# Patient Record
Sex: MALE | Race: WHITE | NOT HISPANIC OR LATINO | Employment: FULL TIME | URBAN - METROPOLITAN AREA
[De-identification: names, ages, dates, MRNs, and addresses within clinical notes are randomized per-mention and may not be internally consistent; named-entity substitution may affect disease eponyms.]

---

## 2024-08-27 ENCOUNTER — OFFICE VISIT (OUTPATIENT)
Dept: PRIMARY CARE | Facility: CLINIC | Age: 59
End: 2024-08-27
Payer: COMMERCIAL

## 2024-08-27 VITALS
HEIGHT: 74 IN | DIASTOLIC BLOOD PRESSURE: 82 MMHG | OXYGEN SATURATION: 98 % | WEIGHT: 261 LBS | HEART RATE: 78 BPM | SYSTOLIC BLOOD PRESSURE: 134 MMHG | BODY MASS INDEX: 33.5 KG/M2 | TEMPERATURE: 98.5 F

## 2024-08-27 DIAGNOSIS — Z85.72 HISTORY OF NON-HODGKIN'S LYMPHOMA: ICD-10-CM

## 2024-08-27 DIAGNOSIS — Z11.59 NEED FOR HEPATITIS C SCREENING TEST: ICD-10-CM

## 2024-08-27 DIAGNOSIS — Z13.0 SCREENING FOR DEFICIENCY ANEMIA: ICD-10-CM

## 2024-08-27 DIAGNOSIS — I10 PRIMARY HYPERTENSION: ICD-10-CM

## 2024-08-27 DIAGNOSIS — Z13.228 SCREENING FOR METABOLIC DISORDER: ICD-10-CM

## 2024-08-27 DIAGNOSIS — E78.2 MIXED HYPERLIPIDEMIA: ICD-10-CM

## 2024-08-27 DIAGNOSIS — Z00.00 ROUTINE ADULT HEALTH MAINTENANCE: Primary | ICD-10-CM

## 2024-08-27 DIAGNOSIS — Z12.5 PROSTATE CANCER SCREENING: ICD-10-CM

## 2024-08-27 DIAGNOSIS — Z12.11 COLON CANCER SCREENING: ICD-10-CM

## 2024-08-27 PROBLEM — I71.40 ABDOMINAL AORTIC ANEURYSM WITHOUT RUPTURE (CMS-HCC): Status: ACTIVE | Noted: 2019-03-08

## 2024-08-27 PROBLEM — N52.9 ERECTILE DYSFUNCTION: Status: ACTIVE | Noted: 2021-09-02

## 2024-08-27 PROBLEM — I25.10 ATHEROSCLEROTIC HEART DISEASE OF NATIVE CORONARY ARTERY WITHOUT ANGINA PECTORIS: Status: ACTIVE | Noted: 2019-05-17

## 2024-08-27 PROBLEM — I49.9 CARDIAC ARRHYTHMIA: Status: ACTIVE | Noted: 2017-07-24

## 2024-08-27 PROBLEM — M17.11 PRIMARY OSTEOARTHRITIS OF RIGHT KNEE: Status: ACTIVE | Noted: 2021-04-21

## 2024-08-27 PROBLEM — K21.9 GERD (GASTROESOPHAGEAL REFLUX DISEASE): Status: ACTIVE | Noted: 2021-11-28

## 2024-08-27 PROBLEM — I71.40 ABDOMINAL AORTIC ANEURYSM WITHOUT RUPTURE (CMS-HCC): Status: RESOLVED | Noted: 2019-03-08 | Resolved: 2024-08-27

## 2024-08-27 PROCEDURE — 99386 PREV VISIT NEW AGE 40-64: CPT | Performed by: FAMILY MEDICINE

## 2024-08-27 PROCEDURE — 3075F SYST BP GE 130 - 139MM HG: CPT | Performed by: FAMILY MEDICINE

## 2024-08-27 PROCEDURE — 3079F DIAST BP 80-89 MM HG: CPT | Performed by: FAMILY MEDICINE

## 2024-08-27 PROCEDURE — 3008F BODY MASS INDEX DOCD: CPT | Performed by: FAMILY MEDICINE

## 2024-08-27 PROCEDURE — 1036F TOBACCO NON-USER: CPT | Performed by: FAMILY MEDICINE

## 2024-08-27 PROCEDURE — 99203 OFFICE O/P NEW LOW 30 MIN: CPT | Performed by: FAMILY MEDICINE

## 2024-08-27 ASSESSMENT — PATIENT HEALTH QUESTIONNAIRE - PHQ9
1. LITTLE INTEREST OR PLEASURE IN DOING THINGS: NOT AT ALL
2. FEELING DOWN, DEPRESSED OR HOPELESS: NOT AT ALL
SUM OF ALL RESPONSES TO PHQ9 QUESTIONS 1 AND 2: 0

## 2024-08-27 ASSESSMENT — PAIN SCALES - GENERAL: PAINLEVEL: 0-NO PAIN

## 2024-08-27 NOTE — PROGRESS NOTES
Outpatient Visit Note    Chief Complaint   Patient presents with    New Patient Visit    Annual Exam       HPI:  Errol Baeza is a 58 y.o. male who presents to the office as a new patient to establish care and for annual well exam    Review notes a past medical history significant for but not exclusive to previous non-Hodgkin's lymphoma, CAD with associated hypertension and hyperlipidemia.  Recently moved to the area from [place].  States to have completed chemotherapy and is in need to establish with new oncology team for ongoing monitoring of IgG levels.  Does have history of suppressed immune system after treatment with prior pneumonia.  Did have recent upper respiratory congestion to which he went to local urgent care approximately 1 week ago.  Was placed on antibiotic course to which symptoms have improved.  Denies any active complaints of fever or congestion though does have mild lingering nonproductive cough.    Well Exam:  Overall, they describe their health as fair with no reports of recent illness or hospitalization. Denies issues of chest pain, shortness of breath, headaches, vision/hearing changes, abdominal pain, vomiting, diarrhea, melena, hematochezia, constipation or urinary symptoms.    States you have had previous fluctuating blood pressures when going through chemotherapy and with recurrent infections.  Denies any active use of antihypertensives.  States that blood pressures have been stable with most recent visit.  Does have blood pressure cuff at home though denies regularly checking    Preventative Health Maintenance:  In regards to preventative health maintenance, last Tdap received unknown. Flu shot not received for past season. n regards to CRC screening, states has had colonoscopy in the past though this was likely close to 10 years ago. COVID-19 vaccination series previously completed with patient having significant reaction to series.    Current Medications  No current  outpatient medications     Allergies  No Known Allergies     Immunizations    There is no immunization history on file for this patient.     Past Medical History:   Diagnosis Date    Hemolytic anemia (CMS-HCC)     Lymphoma (Multi)       Past Surgical History:   Procedure Laterality Date    BACK SURGERY      CHOLECYSTECTOMY      KNEE SURGERY      SHOULDER SURGERY      SPLENECTOMY, TOTAL       Family History   Problem Relation Name Age of Onset    Breast cancer Mother      Hypertension Other          family hx     Social History     Tobacco Use    Smoking status: Never    Smokeless tobacco: Never   Vaping Use    Vaping status: Never Used   Substance Use Topics    Alcohol use: Yes     Alcohol/week: 2.0 standard drinks of alcohol     Types: 2 Cans of beer per week     Comment: daily       ROS  All pertinent positive symptoms are included in the history of present illness.  All other systems have been reviewed and are negative and noncontributory to this patient's current ailments.    PHQ9/GAD7:        VITAL SIGNS  Vitals:    08/27/24 1505   BP: 134/82   Pulse: 78   Temp: 36.9 °C (98.5 °F)   SpO2: 98%       PHYSICAL EXAM  GENERAL APPEARANCE: alert and oriented, Pleasant and cooperative, No Acute Distress.   HEENT: EOMI, PERRLA, TMs intact and flat bilaterally, patent nares, normal oropharynx, MMM  NECK: no lymphadenopathy, no thyromegaly.   HEART: RRR, normal S1S2, no murmurs, click or rubs.   LUNGS: clear to auscultation bilaterally, no wheezes/rhonchi/rales.   ABDOMEN: soft, non-tender, no organomegaly, no masses palpated, no guarding or rigidity.   EXTREMITIES: no edema, normal ROM  SKIN: normal, no rash, unremarkable.   NEUROLOGIC EXAM: non-focal exam.   MUSCULOSKELETAL: no gross abnormalities.   PSYCH: affect is normal, eye contact is good.     Assessment/Plan   Problem List Items Addressed This Visit             ICD-10-CM    History of non-Hodgkin's lymphoma Z85.72     - Will plan to establish with local oncology  team with referral placed today         Relevant Orders    Referral to Hematology and Oncology    Mixed hyperlipidemia E78.2     - Will check cholesterol levels with next panel with routine blood work  -Continue to focus on healthy, low-fat diet         Relevant Orders    TSH with reflex to Free T4 if abnormal    Lipid Panel    Comprehensive Metabolic Panel    Primary hypertension I10     - Blood pressure stable in office today with no active use of antihypertensives         Relevant Orders    CBC     Other Visit Diagnoses         Codes    Routine adult health maintenance    -  Primary Z00.00    Relevant Orders    TSH with reflex to Free T4 if abnormal    Lipid Panel    Comprehensive Metabolic Panel    CBC    Hepatitis C antibody    Prostate Spec.Ag,Screen    Cologuard® colon cancer screening    Screening for deficiency anemia     Z13.0    Relevant Orders    CBC    Screening for metabolic disorder     Z13.228    Relevant Orders    TSH with reflex to Free T4 if abnormal    Lipid Panel    Comprehensive Metabolic Panel    Prostate cancer screening     Z12.5    Relevant Orders    Prostate Spec.Ag,Screen    Colon cancer screening     Z12.11    Relevant Orders    Cologuard® colon cancer screening    Need for hepatitis C screening test     Z11.59    Relevant Orders    Hepatitis C antibody            Additional Visit Plans:  - Complete history and physical examination was performed    GENERAL RECOMMENDATIONS:  - Complete review of history of physical exam completed today  - A healthy diet to maintain a normal BMI (under 25) to reduce heart disease, risk for diabetes encouraged.  - Exercising 150 minutes per week and eating healthy to reduce heart disease.  - Blood pressure screen completed.    BLOOD TESTING:  - Orders for fasting routine blood work given today, to be completed at your earliest convenience  - Will contact you with the blood work results once received and reviewed.    General Recommendations include:  -  Cholesterol and diabetes screen if risk factors (overweight, high blood pressure).  - Sexually transmitted infections if risk factors.  - Hepatitis C virus screen for all adults-ordered blood work today    VACCINATIONS RECOMMENDATIONS:  - Flu shot annually - advocated seasonally  - Tetanus booster every 10 years - advocated  - Pneumonia vaccination starting at 65 years old (or earlier if risk factors - smoker, diabetic, heart or lung conditions) -not due yet  - Shingles vaccine for those 50 years or older - check with your insurance for SHINGRIX coverage and get it at your local pharmacy -advocated  -COVID-19 vaccine series advocated    SCREENINGS RECOMMENDATIONS:  -Colon cancer screening (with colonoscopy or Cologuard) for men and women starting at age 45 until 74 years old - discussed and advocated    Counseling:       Medication education:         Education:  The patient is counseled regarding potential side-effects of all new medications        Understanding:  Patient expressed understanding        Adherence:  No barriers to adherence identified      ** Please excuse any errors in grammar or translation related to this dictation. Voice recognition software was utilized to prepare this document. **

## 2024-08-27 NOTE — PATIENT INSTRUCTIONS
Problem List Items Addressed This Visit             ICD-10-CM    History of non-Hodgkin's lymphoma Z85.72     - Will plan to establish with local oncology team with referral placed today         Relevant Orders    Referral to Hematology and Oncology    Mixed hyperlipidemia E78.2     - Will check cholesterol levels with next panel with routine blood work  -Continue to focus on healthy, low-fat diet         Relevant Orders    TSH with reflex to Free T4 if abnormal    Lipid Panel    Comprehensive Metabolic Panel    Primary hypertension I10     - Blood pressure stable in office today with no active use of antihypertensives         Relevant Orders    CBC     Other Visit Diagnoses         Codes    Routine adult health maintenance    -  Primary Z00.00    Relevant Orders    TSH with reflex to Free T4 if abnormal    Lipid Panel    Comprehensive Metabolic Panel    CBC    Hepatitis C antibody    Prostate Spec.Ag,Screen    Cologuard® colon cancer screening    Screening for deficiency anemia     Z13.0    Relevant Orders    CBC    Screening for metabolic disorder     Z13.228    Relevant Orders    TSH with reflex to Free T4 if abnormal    Lipid Panel    Comprehensive Metabolic Panel    Prostate cancer screening     Z12.5    Relevant Orders    Prostate Spec.Ag,Screen    Colon cancer screening     Z12.11    Relevant Orders    Cologuard® colon cancer screening    Need for hepatitis C screening test     Z11.59    Relevant Orders    Hepatitis C antibody            Additional Visit Plans:  - Complete history and physical examination was performed    GENERAL RECOMMENDATIONS:  - Complete review of history of physical exam completed today  - A healthy diet to maintain a normal BMI (under 25) to reduce heart disease, risk for diabetes encouraged.  - Exercising 150 minutes per week and eating healthy to reduce heart disease.  - Blood pressure screen completed.    BLOOD TESTING:  - Orders for fasting routine blood work given today, to be  completed at your earliest convenience  - Will contact you with the blood work results once received and reviewed.    General Recommendations include:  - Cholesterol and diabetes screen if risk factors (overweight, high blood pressure).  - Sexually transmitted infections if risk factors.  - Hepatitis C virus screen for all adults-ordered blood work today    VACCINATIONS RECOMMENDATIONS:  - Flu shot annually - advocated seasonally  - Tetanus booster every 10 years - advocated  - Pneumonia vaccination starting at 65 years old (or earlier if risk factors - smoker, diabetic, heart or lung conditions) -not due yet  - Shingles vaccine for those 50 years or older - check with your insurance for SHINGRIX coverage and get it at your local pharmacy -advocated  -COVID-19 vaccine series advocated    SCREENINGS RECOMMENDATIONS:  -Colon cancer screening (with colonoscopy or Cologuard) for men and women starting at age 45 until 74 years old - discussed and advocated    Counseling:       Medication education:         Education:  The patient is counseled regarding potential side-effects of all new medications        Understanding:  Patient expressed understanding        Adherence:  No barriers to adherence identified      ** Please excuse any errors in grammar or translation related to this dictation. Voice recognition software was utilized to prepare this document. **

## 2024-08-27 NOTE — ASSESSMENT & PLAN NOTE
- Will check cholesterol levels with next panel with routine blood work  -Continue to focus on healthy, low-fat diet

## 2024-09-16 ENCOUNTER — APPOINTMENT (OUTPATIENT)
Dept: PRIMARY CARE | Facility: CLINIC | Age: 59
End: 2024-09-16
Payer: COMMERCIAL

## 2024-11-19 ENCOUNTER — APPOINTMENT (OUTPATIENT)
Dept: HEMATOLOGY/ONCOLOGY | Facility: CLINIC | Age: 59
End: 2024-11-19
Payer: COMMERCIAL

## 2024-12-11 ENCOUNTER — TELEPHONE (OUTPATIENT)
Dept: PRIMARY CARE | Facility: CLINIC | Age: 59
End: 2024-12-11
Payer: COMMERCIAL

## 2024-12-11 NOTE — TELEPHONE ENCOUNTER
Unfortunately, I do not know if patient has intentions to continue with follow-up plans as he subsequently canceled his follow-up appointment with our office and has not followed through with any blood work which had been ordered.  Will see if patient ultimately plans for follow-up to which we can further discuss making up with specialist if warranted going forward

## 2024-12-11 NOTE — TELEPHONE ENCOUNTER
Message from scheduling department:   Hello our sched dept reached out to pt 5x to resched HEM/ONC appt but no response from pt.   How would you like to proceed?   Thank you    Please advise.

## 2025-01-19 ENCOUNTER — OFFICE VISIT (OUTPATIENT)
Dept: URGENT CARE | Age: 60
End: 2025-01-19
Payer: COMMERCIAL

## 2025-01-19 VITALS
DIASTOLIC BLOOD PRESSURE: 112 MMHG | RESPIRATION RATE: 16 BRPM | BODY MASS INDEX: 34.67 KG/M2 | SYSTOLIC BLOOD PRESSURE: 155 MMHG | OXYGEN SATURATION: 95 % | HEART RATE: 74 BPM | WEIGHT: 270 LBS | TEMPERATURE: 97.9 F

## 2025-01-19 DIAGNOSIS — S61.209A AVULSION OF FINGERTIP, INITIAL ENCOUNTER: Primary | ICD-10-CM

## 2025-01-19 RX ORDER — CEPHALEXIN 500 MG/1
500 CAPSULE ORAL 4 TIMES DAILY
Qty: 21 CAPSULE | Refills: 0 | Status: SHIPPED | OUTPATIENT
Start: 2025-01-19 | End: 2025-01-24

## 2025-01-19 RX ORDER — CEPHALEXIN 500 MG/1
500 CAPSULE ORAL 4 TIMES DAILY
Qty: 21 CAPSULE | Refills: 0 | Status: SHIPPED | OUTPATIENT
Start: 2025-01-19 | End: 2025-01-19

## 2025-01-19 NOTE — PATIENT INSTRUCTIONS
Thank you for visiting  urgent care.      Recheck elevated BP with primary care provider.      Monitor for signs and symptoms of infection such as redness, swelling, pain, white or yellow drainage from the wound or fever or chills.  If any of these occur follow-up with the emergency department for further evaluation of your symptoms.  Beginning tomorrow cleanse around the wound with gentle soap and water and pat dry.  Do not submerge in water such as washing dishes or swimming until wound has completely healed.

## 2025-01-19 NOTE — PROGRESS NOTES
Subjective   Patient ID: Errol Baeza is a 59 y.o. male. They present today with a chief complaint of left cut finger (1 hour ago with table saw/).    History of Present Illness  See mdm           Past Medical History  Allergies as of 01/19/2025    (No Known Allergies)       (Not in a hospital admission)       Past Medical History:   Diagnosis Date    Hemolytic anemia (CMS-HCC)     Lymphoma        Past Surgical History:   Procedure Laterality Date    BACK SURGERY      CHOLECYSTECTOMY      KNEE SURGERY      SHOULDER SURGERY      SPLENECTOMY, TOTAL          reports that he has never smoked. He has never used smokeless tobacco. He reports current alcohol use of about 2.0 standard drinks of alcohol per week.    Review of Systems  Review of Systems   All other systems reviewed and are negative.                                 Objective    Vitals:    01/19/25 1331 01/19/25 1405   BP: (!) 165/115 (!) 155/112   BP Location: Right arm    Patient Position: Sitting    BP Cuff Size: Large adult    Pulse: 74    Resp: 16    Temp: 36.6 °C (97.9 °F)    TempSrc: Oral    SpO2: 95%    Weight: 122 kg (270 lb)      No LMP for male patient.    Physical Exam  Vitals and nursing note reviewed.   Constitutional:       General: He is not in acute distress.     Appearance: He is normal weight. He is not ill-appearing, toxic-appearing or diaphoretic.   HENT:      Head: Normocephalic and atraumatic.      Nose: Nose normal.      Mouth/Throat:      Mouth: Mucous membranes are moist.      Pharynx: No oropharyngeal exudate or posterior oropharyngeal erythema.   Eyes:      General: No scleral icterus.        Right eye: No discharge.         Left eye: No discharge.      Extraocular Movements: Extraocular movements intact.      Conjunctiva/sclera: Conjunctivae normal.      Pupils: Pupils are equal, round, and reactive to light.   Cardiovascular:      Rate and Rhythm: Normal rate and regular rhythm.      Pulses: Normal pulses.      Heart sounds: No  murmur heard.     No friction rub. No gallop.   Pulmonary:      Effort: Pulmonary effort is normal. No respiratory distress.   Abdominal:      General: Abdomen is flat.   Musculoskeletal:         General: Tenderness and signs of injury present. No swelling or deformity.      Cervical back: Normal range of motion and neck supple. No rigidity or tenderness.      Comments: Left third digit: Tip of finger pad with 1.5 cm avulsion type injury hemostatic at time of exam.  No bony or other underlying structure exposure.  No bony tenderness.  Range of motion of the left middle finger completely intact and nonpainful.  Neurovascular intact finger.   Skin:     General: Skin is warm and dry.      Capillary Refill: Capillary refill takes less than 2 seconds.   Neurological:      General: No focal deficit present.      Mental Status: He is alert.   Psychiatric:         Mood and Affect: Mood normal.         Behavior: Behavior normal.         Laceration Repair    Date/Time: 1/19/2025 2:00 PM    Performed by: Yuly Sky PA-C  Authorized by: Yuly Sky PA-C    Consent:     Consent given by:  Patient    Risks discussed:  Infection and pain  Universal protocol:     Patient identity confirmed:  Verbally with patient  Anesthesia:     Anesthesia method:  None  Laceration details:     Location:  Finger    Finger location:  L long finger    Length (cm):  1.5    Depth (mm):  3  Exploration:     Wound exploration: wound explored through full range of motion and entire depth of wound visualized      Wound extent: no foreign bodies/material noted, no muscle damage noted, no nerve damage noted, no tendon damage noted, no underlying fracture noted and no vascular damage noted      Contaminated: no    Treatment:     Area cleansed with:  Chlorhexidine    Amount of cleaning:  Standard    Irrigation solution:  Sterile water    Visualized foreign bodies/material removed: no      Debridement:  None  Skin repair:     Repair method:   Tissue adhesive  Post-procedure details:     Dressing:  Bulky dressing    Procedure completion:  Tolerated well, no immediate complications      Point of Care Test & Imaging Results from this visit  No results found for this visit on 01/19/25.   No results found.    Diagnostic study results (if any) were reviewed by Yuly Sky PA-C.    Assessment/Plan   Allergies, medications, history, and pertinent labs/EKGs/Imaging reviewed by Yuly Sky PA-C.     Medical Decision Making  59 year old male, asplenic presents with complaint of left middle finger table saw injury.  Patient reports he cut finger pad of left middle finger with tablesaw several minutes prior to arrival in the clinic.  He is unsure of last tetanus vaccination.  He does have history of splenectomy due to hemolytic anemia many years ago.  Also has history of non-Hodgkin's lymphoma with chemotherapy last 2 years ago.  Exam remarkable for fingertip avulsion injury as above.  Neurovascular intact.  Injury not amenable to suture repair.  Layers of skin adhesive applied for protection during healing by secondary intention.  Tetanus updated.  Given patient's history of asplenia he is given prescription for Keflex.  Discussed signs and symptoms of infection, indications for return or follow-up with emergency department.  Discussed wound care.  No features suggestive of neurovascular compromise, bony injury, retained foreign body, underlying structure injury or other emergency.  Discharged good condition agreeable to plan as discussed.  Follow-up with primary care provider for elevated blood pressure.  There is no headache, dizziness, chest pain or other features suggestive of hypertensive emergency.      Orders and Diagnoses  Diagnoses and all orders for this visit:  Avulsion of fingertip, initial encounter  -     cephalexin (Keflex) 500 mg capsule; Take 1 capsule (500 mg) by mouth 4 times a day for 5 days.  Other orders  -     Tdap vaccine, age 7  years and older  (BOOSTRIX)      Medical Admin Record      Patient disposition: Home    Electronically signed by Yuly Sky PA-C  2:52 PM

## 2025-03-13 PROBLEM — C85.90 NON-HODGKIN LYMPHOMA: Status: ACTIVE | Noted: 2025-03-13

## 2025-03-28 ENCOUNTER — APPOINTMENT (OUTPATIENT)
Dept: PRIMARY CARE | Facility: CLINIC | Age: 60
End: 2025-03-28
Payer: COMMERCIAL

## 2025-03-28 VITALS
DIASTOLIC BLOOD PRESSURE: 86 MMHG | WEIGHT: 268.8 LBS | SYSTOLIC BLOOD PRESSURE: 142 MMHG | OXYGEN SATURATION: 97 % | TEMPERATURE: 97.9 F | HEART RATE: 61 BPM | BODY MASS INDEX: 34.5 KG/M2 | HEIGHT: 74 IN

## 2025-03-28 DIAGNOSIS — K21.9 GASTROESOPHAGEAL REFLUX DISEASE, UNSPECIFIED WHETHER ESOPHAGITIS PRESENT: ICD-10-CM

## 2025-03-28 DIAGNOSIS — Z11.59 NEED FOR HEPATITIS C SCREENING TEST: ICD-10-CM

## 2025-03-28 DIAGNOSIS — I25.10 ATHEROSCLEROSIS OF NATIVE CORONARY ARTERY OF NATIVE HEART WITHOUT ANGINA PECTORIS: Primary | ICD-10-CM

## 2025-03-28 DIAGNOSIS — E78.2 MIXED HYPERLIPIDEMIA: ICD-10-CM

## 2025-03-28 DIAGNOSIS — Z85.72 HISTORY OF NON-HODGKIN'S LYMPHOMA: ICD-10-CM

## 2025-03-28 DIAGNOSIS — Z12.5 PROSTATE CANCER SCREENING: ICD-10-CM

## 2025-03-28 DIAGNOSIS — I10 PRIMARY HYPERTENSION: ICD-10-CM

## 2025-03-28 DIAGNOSIS — R73.9 HYPERGLYCEMIA: ICD-10-CM

## 2025-03-28 DIAGNOSIS — R53.83 FATIGUE, UNSPECIFIED TYPE: ICD-10-CM

## 2025-03-28 DIAGNOSIS — E55.9 VITAMIN D DEFICIENCY: ICD-10-CM

## 2025-03-28 PROCEDURE — 3079F DIAST BP 80-89 MM HG: CPT | Performed by: NURSE PRACTITIONER

## 2025-03-28 PROCEDURE — 3077F SYST BP >= 140 MM HG: CPT | Performed by: NURSE PRACTITIONER

## 2025-03-28 PROCEDURE — 1036F TOBACCO NON-USER: CPT | Performed by: NURSE PRACTITIONER

## 2025-03-28 PROCEDURE — 99214 OFFICE O/P EST MOD 30 MIN: CPT | Performed by: NURSE PRACTITIONER

## 2025-03-28 PROCEDURE — 3008F BODY MASS INDEX DOCD: CPT | Performed by: NURSE PRACTITIONER

## 2025-03-28 RX ORDER — LOSARTAN POTASSIUM 25 MG/1
25 TABLET ORAL DAILY
Qty: 90 TABLET | Refills: 3 | Status: SHIPPED | OUTPATIENT
Start: 2025-03-28 | End: 2026-03-28

## 2025-03-28 ASSESSMENT — ENCOUNTER SYMPTOMS
DEPRESSION: 0
POLYPHAGIA: 0
PALPITATIONS: 0
GASTROINTESTINAL NEGATIVE: 1
BRUISES/BLEEDS EASILY: 0
SHORTNESS OF BREATH: 0
FEVER: 0
NEUROLOGICAL NEGATIVE: 1
ADENOPATHY: 0
LOSS OF SENSATION IN FEET: 0
OCCASIONAL FEELINGS OF UNSTEADINESS: 0
COUGH: 0
MUSCULOSKELETAL NEGATIVE: 1
CHILLS: 0
EYES NEGATIVE: 1
PSYCHIATRIC NEGATIVE: 1
ALLERGIC/IMMUNOLOGIC NEGATIVE: 1
POLYDIPSIA: 0

## 2025-03-28 ASSESSMENT — LIFESTYLE VARIABLES
HAVE YOU OR SOMEONE ELSE BEEN INJURED AS A RESULT OF YOUR DRINKING: NO
HOW OFTEN DURING THE LAST YEAR HAVE YOU NEEDED AN ALCOHOLIC DRINK FIRST THING IN THE MORNING TO GET YOURSELF GOING AFTER A NIGHT OF HEAVY DRINKING: NEVER
HOW OFTEN DURING THE LAST YEAR HAVE YOU FOUND THAT YOU WERE NOT ABLE TO STOP DRINKING ONCE YOU HAD STARTED: NEVER
HOW OFTEN DURING THE LAST YEAR HAVE YOU FAILED TO DO WHAT WAS NORMALLY EXPECTED FROM YOU BECAUSE OF DRINKING: NEVER
HOW MANY STANDARD DRINKS CONTAINING ALCOHOL DO YOU HAVE ON A TYPICAL DAY: 1 OR 2
AUDIT-C TOTAL SCORE: 4
HOW OFTEN DO YOU HAVE A DRINK CONTAINING ALCOHOL: 4 OR MORE TIMES A WEEK
SKIP TO QUESTIONS 9-10: 1
HOW OFTEN DO YOU HAVE SIX OR MORE DRINKS ON ONE OCCASION: NEVER
HAS A RELATIVE, FRIEND, DOCTOR, OR ANOTHER HEALTH PROFESSIONAL EXPRESSED CONCERN ABOUT YOUR DRINKING OR SUGGESTED YOU CUT DOWN: NO
HOW OFTEN DURING THE LAST YEAR HAVE YOU BEEN UNABLE TO REMEMBER WHAT HAPPENED THE NIGHT BEFORE BECAUSE YOU HAD BEEN DRINKING: NEVER
AUDIT TOTAL SCORE: 4
HOW OFTEN DURING THE LAST YEAR HAVE YOU HAD A FEELING OF GUILT OR REMORSE AFTER DRINKING: NEVER

## 2025-03-28 ASSESSMENT — PATIENT HEALTH QUESTIONNAIRE - PHQ9
1. LITTLE INTEREST OR PLEASURE IN DOING THINGS: NOT AT ALL
SUM OF ALL RESPONSES TO PHQ9 QUESTIONS 1 AND 2: 0
2. FEELING DOWN, DEPRESSED OR HOPELESS: NOT AT ALL

## 2025-03-28 ASSESSMENT — PAIN SCALES - GENERAL: PAINLEVEL_OUTOF10: 0-NO PAIN

## 2025-03-28 NOTE — PATIENT INSTRUCTIONS
[facility]  [address] [place] [address]  [phone]  Focus on healthy eating including lean proteins and vegetables.  Avoid high carbohydrate high sugar foods and drinks.  Try to increase physical activity as tolerated.  Goal is for 160 minutes/week of physical activity.  Focus on healthy eating including lean proteins and vegetables.  Avoid high carbohydrate high sugar foods and drinks.  Try to increase physical activity as tolerated.  Goal is for 160 minutes/week of physical activity.    Labwork obtained today.  Will address results when available

## 2025-03-28 NOTE — PROGRESS NOTES
"Subjective   Patient ID: Errol Baeza is a 59 y.o. male who presents for Establish Care (Patient mentioned a physical for today.).    Patient presents today to [facility]. Review notes a past medical history significant for but not exclusive to previous non-Hodgkin's lymphoma, hemolytic anemia, CAD with associated hypertension and hyperlipidemia.  Has had splenectomy. Recently moved to the area from [place].  States he has completed chemotherapy and is in need to establish with new oncology team for ongoing monitoring of IgG levels.  Does have history of suppressed immune system after treatment with prior pneumonia.  Did have recent upper respiratory congestion to which he went to local urgent care approximately 1 week ago.  Was placed on antibiotic course to which symptoms have improved.  Denies any active complaints of fever or congestion though does have mild lingering nonproductive cough. Patient states left eye pressure when /100 at home and lowest is 140/88. Has not been taking his losartan. He stopped Losartan and Rosuvastatin on his own as he \"does not like to take medications\". Discussed HTN and risks and benefits of treatment to lower Bp.  At this time he is agreeable to restart losartan.  He is due for lab work which we will order today.  We will address results when available.  He was previously referred to oncology Dr. Rodgers, but never made appointment.  Will rerefer to hematology oncology for follow-up.       Review of Systems   Constitutional:  Negative for chills and fever.   HENT: Negative.     Eyes: Negative.    Respiratory:  Negative for cough and shortness of breath.    Cardiovascular:  Negative for chest pain and palpitations.   Gastrointestinal: Negative.    Endocrine: Negative for heat intolerance, polydipsia, polyphagia and polyuria.   Genitourinary: Negative.    Musculoskeletal: Negative.    Skin: Negative.    Allergic/Immunologic: Negative.    Neurological: Negative.  " "  Hematological:  Negative for adenopathy. Does not bruise/bleed easily.   Psychiatric/Behavioral: Negative.         Objective   /86   Pulse 61   Temp 36.6 °C (97.9 °F) (Temporal)   Ht 1.88 m (6' 2\")   Wt 122 kg (268 lb 12.8 oz)   SpO2 97%   BMI 34.51 kg/m²     Physical Exam  Constitutional:       Appearance: Normal appearance.   HENT:      Head: Normocephalic.      Right Ear: Tympanic membrane normal.      Left Ear: Tympanic membrane normal.      Nose: Nose normal.      Mouth/Throat:      Mouth: Mucous membranes are dry.   Eyes:      General:         Right eye: No discharge.         Left eye: No discharge.   Neck:      Vascular: No carotid bruit.   Cardiovascular:      Rate and Rhythm: Normal rate and regular rhythm.      Pulses: Normal pulses.      Heart sounds: Normal heart sounds.   Pulmonary:      Effort: Pulmonary effort is normal.      Breath sounds: Normal breath sounds.   Abdominal:      General: Bowel sounds are normal.      Palpations: Abdomen is soft.   Musculoskeletal:         General: Normal range of motion.      Cervical back: No tenderness.   Lymphadenopathy:      Cervical: No cervical adenopathy.   Skin:     General: Skin is warm and dry.      Capillary Refill: Capillary refill takes less than 2 seconds.   Neurological:      General: No focal deficit present.      Mental Status: He is alert and oriented to person, place, and time.   Psychiatric:         Mood and Affect: Mood normal.       Assessment/Plan   Problem List Items Addressed This Visit             ICD-10-CM    Atherosclerotic heart disease of native coronary artery without angina pectoris - Primary I25.10    Relevant Orders    Follow Up In Primary Care - Established    GERD (gastroesophageal reflux disease) K21.9    Relevant Orders    Follow Up In Primary Care - Established    History of non-Hodgkin's lymphoma Z85.72    Relevant Orders    Referral To Hematology and Oncology    Mixed hyperlipidemia E78.2    Primary hypertension " I10    Relevant Medications    losartan (Cozaar) 25 mg tablet    Other Relevant Orders    Comprehensive Metabolic Panel     Other Visit Diagnoses         Codes    Hyperglycemia     R73.9    Relevant Orders    Hemoglobin A1C    Fatigue, unspecified type     R53.83    Relevant Orders    CBC and Auto Differential    TSH with reflex to Free T4 if abnormal    Follow Up In Primary Care - Established    Vitamin D deficiency     E55.9    Relevant Orders    Vitamin D 25-Hydroxy,Total (for eval of Vitamin D levels)    Prostate cancer screening     Z12.5    Relevant Orders    Prostate Specific Antigen    Need for hepatitis C screening test     Z11.59    Relevant Orders    Hepatitis C Antibody        [facility]  [address] [place], [place] [id number] [phone]  Focus on healthy eating including lean proteins and vegetables.  Avoid high carbohydrate high sugar foods and drinks.  Try to increase physical activity as tolerated.  Goal is for 160 minutes/week of physical activity.  Focus on healthy eating including lean proteins and vegetables.  Avoid high carbohydrate high sugar foods and drinks.  Try to increase physical activity as tolerated.  Goal is for 160 minutes/week of physical activity.

## 2025-03-29 ENCOUNTER — PATIENT MESSAGE (OUTPATIENT)
Dept: PRIMARY CARE | Facility: CLINIC | Age: 60
End: 2025-03-29
Payer: COMMERCIAL

## 2025-03-29 DIAGNOSIS — Z85.72 HISTORY OF NON-HODGKIN'S LYMPHOMA: Primary | ICD-10-CM

## 2025-03-29 DIAGNOSIS — R53.83 FATIGUE, UNSPECIFIED TYPE: ICD-10-CM

## 2025-03-29 DIAGNOSIS — R55 NEAR SYNCOPE: ICD-10-CM

## 2025-04-01 DIAGNOSIS — E78.2 MIXED HYPERLIPIDEMIA: Primary | ICD-10-CM

## 2025-04-03 LAB
CHOLEST SERPL-MCNC: 197 MG/DL
CHOLEST/HDLC SERPL: 3.8 (CALC)
HDLC SERPL-MCNC: 52 MG/DL
LDLC SERPL CALC-MCNC: 126 MG/DL (CALC)
NONHDLC SERPL-MCNC: 145 MG/DL (CALC)
TRIGL SERPL-MCNC: 93 MG/DL

## 2025-04-05 LAB
25(OH)D3+25(OH)D2 SERPL-MCNC: 24 NG/ML (ref 30–100)
ALBUMIN SERPL-MCNC: 4.4 G/DL (ref 3.6–5.1)
ALP SERPL-CCNC: 86 U/L (ref 35–144)
ALT SERPL-CCNC: 15 U/L (ref 9–46)
ANION GAP SERPL CALCULATED.4IONS-SCNC: 9 MMOL/L (CALC) (ref 7–17)
AST SERPL-CCNC: 19 U/L (ref 10–35)
BASOPHILS # BLD AUTO: 109 CELLS/UL (ref 0–200)
BASOPHILS NFR BLD AUTO: 1.2 %
BILIRUB SERPL-MCNC: 0.8 MG/DL (ref 0.2–1.2)
BUN SERPL-MCNC: 12 MG/DL (ref 7–25)
CALCIUM SERPL-MCNC: 9.3 MG/DL (ref 8.6–10.3)
CHLORIDE SERPL-SCNC: 101 MMOL/L (ref 98–110)
CO2 SERPL-SCNC: 28 MMOL/L (ref 20–32)
CREAT SERPL-MCNC: 0.84 MG/DL (ref 0.7–1.3)
EGFRCR SERPLBLD CKD-EPI 2021: 100 ML/MIN/1.73M2
EOSINOPHIL # BLD AUTO: 710 CELLS/UL (ref 15–500)
EOSINOPHIL NFR BLD AUTO: 7.8 %
ERYTHROCYTE [DISTWIDTH] IN BLOOD BY AUTOMATED COUNT: 13.4 % (ref 11–15)
EST. AVERAGE GLUCOSE BLD GHB EST-MCNC: 114 MG/DL
EST. AVERAGE GLUCOSE BLD GHB EST-SCNC: 6.3 MMOL/L
GLUCOSE SERPL-MCNC: 91 MG/DL (ref 65–99)
HBA1C MFR BLD: 5.6 % OF TOTAL HGB
HCT VFR BLD AUTO: 49.8 % (ref 38.5–50)
HCV AB SERPL QL IA: NORMAL
HGB BLD-MCNC: 16.8 G/DL (ref 13.2–17.1)
LYMPHOCYTES # BLD AUTO: 1993 CELLS/UL (ref 850–3900)
LYMPHOCYTES NFR BLD AUTO: 21.9 %
MCH RBC QN AUTO: 32.4 PG (ref 27–33)
MCHC RBC AUTO-ENTMCNC: 33.7 G/DL (ref 32–36)
MCV RBC AUTO: 96 FL (ref 80–100)
MONOCYTES # BLD AUTO: 1019 CELLS/UL (ref 200–950)
MONOCYTES NFR BLD AUTO: 11.2 %
NEUTROPHILS # BLD AUTO: 5269 CELLS/UL (ref 1500–7800)
NEUTROPHILS NFR BLD AUTO: 57.9 %
PLATELET # BLD AUTO: 376 THOUSAND/UL (ref 140–400)
PMV BLD REES-ECKER: 10.4 FL (ref 7.5–12.5)
POTASSIUM SERPL-SCNC: 4.7 MMOL/L (ref 3.5–5.3)
PROT SERPL-MCNC: 6.2 G/DL (ref 6.1–8.1)
PSA SERPL-MCNC: 0.82 NG/ML
RBC # BLD AUTO: 5.19 MILLION/UL (ref 4.2–5.8)
SODIUM SERPL-SCNC: 138 MMOL/L (ref 135–146)
T4 FREE SERPL-MCNC: 1.3 NG/DL (ref 0.8–1.8)
TSH SERPL-ACNC: 4.6 MIU/L (ref 0.4–4.5)
WBC # BLD AUTO: 9.1 THOUSAND/UL (ref 3.8–10.8)

## 2025-04-08 ENCOUNTER — PATIENT OUTREACH (OUTPATIENT)
Dept: HEMATOLOGY/ONCOLOGY | Facility: CLINIC | Age: 60
End: 2025-04-08
Payer: COMMERCIAL

## 2025-04-08 SDOH — ECONOMIC STABILITY: GENERAL
WHICH OF THE FOLLOWING WOULD YOU LIKE TO GET CONNECTED TO IN ORDER TO RECEIVE A DISCOUNT OR FOR FREE? (CHOOSE ALL THAT APPLY): PATIENT DECLINED

## 2025-04-08 SDOH — ECONOMIC STABILITY: FOOD INSECURITY: WITHIN THE PAST 12 MONTHS, YOU WORRIED THAT YOUR FOOD WOULD RUN OUT BEFORE YOU GOT MONEY TO BUY MORE.: NEVER TRUE

## 2025-04-08 SDOH — ECONOMIC STABILITY: INCOME INSECURITY: IN THE LAST 12 MONTHS, WAS THERE A TIME WHEN YOU WERE NOT ABLE TO PAY THE MORTGAGE OR RENT ON TIME?: NO

## 2025-04-08 SDOH — ECONOMIC STABILITY: FOOD INSECURITY: WITHIN THE PAST 12 MONTHS, THE FOOD YOU BOUGHT JUST DIDN'T LAST AND YOU DIDN'T HAVE MONEY TO GET MORE.: NEVER TRUE

## 2025-04-08 SDOH — ECONOMIC STABILITY: GENERAL
WHICH OF THE FOLLOWING DO YOU KNOW HOW TO USE AND HAVE ACCESS TO EVERY DAY? (CHOOSE ALL THAT APPLY): DESKTOP COMPUTER, LAPTOP COMPUTER, OR TABLET WITH BROADBAND INTERNET CONNECTION;SMARTPHONE WITH CELLULAR DATA PLAN

## 2025-04-08 ASSESSMENT — SOCIAL DETERMINANTS OF HEALTH (SDOH)
DO YOU BELONG TO ANY CLUBS OR ORGANIZATIONS SUCH AS CHURCH GROUPS UNIONS, FRATERNAL OR ATHLETIC GROUPS, OR SCHOOL GROUPS?: NO
HOW OFTEN DO YOU GET TOGETHER WITH FRIENDS OR RELATIVES?: THREE TIMES A WEEK
WITHIN THE LAST YEAR, HAVE TO BEEN RAPED OR FORCED TO HAVE ANY KIND OF SEXUAL ACTIVITY BY YOUR PARTNER OR EX-PARTNER?: NO
WITHIN THE LAST YEAR, HAVE YOU BEEN HUMILIATED OR EMOTIONALLY ABUSED IN OTHER WAYS BY YOUR PARTNER OR EX-PARTNER?: NO
WITHIN THE LAST YEAR, HAVE YOU BEEN KICKED, HIT, SLAPPED, OR OTHERWISE PHYSICALLY HURT BY YOUR PARTNER OR EX-PARTNER?: NO
IN A TYPICAL WEEK, HOW MANY TIMES DO YOU TALK ON THE PHONE WITH FAMILY, FRIENDS, OR NEIGHBORS?: MORE THAN THREE TIMES A WEEK
IN THE PAST 12 MONTHS, HAS THE ELECTRIC, GAS, OIL, OR WATER COMPANY THREATENED TO SHUT OFF SERVICE IN YOUR HOME?: NO
HOW HARD IS IT FOR YOU TO PAY FOR THE VERY BASICS LIKE FOOD, HOUSING, MEDICAL CARE, AND HEATING?: NOT HARD AT ALL
WITHIN THE LAST YEAR, HAVE YOU BEEN AFRAID OF YOUR PARTNER OR EX-PARTNER?: NO
HOW OFTEN DO YOU ATTEND CHURCH OR RELIGIOUS SERVICES?: NEVER
HOW OFTEN DO YOU ATTENT MEETINGS OF THE CLUB OR ORGANIZATION YOU BELONG TO?: NEVER

## 2025-04-08 NOTE — PROGRESS NOTES
Per referral note, pt recently moved to the area from [place]. States to have completed chemotherapy and is in need to establish with new oncology team for ongoing monitoring of IgG levels. Pt has a history of non-Hodgkin's lymphoma. Attempted to call pt to navigate prior to visit. Pt did not answer but had well established vm. Left message for pt to please return my call so we could discuss upcoming visit. Asked  to try and obtain records.

## 2025-04-08 NOTE — PROGRESS NOTES
Pt recently moved from [place], still has a home in [place]. They plan to retire there. Pt was treated with Ritux x4 and then was on maintenance therapy for 2 treatments short of 2 years. He then started on IVIG. His last appt with his oncologist in [place] felt like d/t his recent lab values, he could just be monitored. Pt would like to establish care for lab monitoring. Pt prefers not to get pet scans d/t a $2500 OOP expense. We briefly discussed lab monitoring options and pt agrees to further discuss with Dr Rodgers at the appointment. Pts oncologist in [place] was Dr Yan with [facility] @ the [facility]. Pt has no SDOH needs or concerns att. Pt has no further questions, verbalized understanding using teach back method.

## 2025-04-23 ENCOUNTER — APPOINTMENT (OUTPATIENT)
Dept: PRIMARY CARE | Facility: CLINIC | Age: 60
End: 2025-04-23
Payer: COMMERCIAL

## 2025-04-28 ENCOUNTER — OFFICE VISIT (OUTPATIENT)
Dept: HEMATOLOGY/ONCOLOGY | Facility: CLINIC | Age: 60
End: 2025-04-28
Payer: COMMERCIAL

## 2025-04-28 VITALS
TEMPERATURE: 98.1 F | DIASTOLIC BLOOD PRESSURE: 85 MMHG | HEART RATE: 72 BPM | HEIGHT: 73 IN | OXYGEN SATURATION: 94 % | SYSTOLIC BLOOD PRESSURE: 123 MMHG | BODY MASS INDEX: 34.9 KG/M2 | WEIGHT: 263.34 LBS

## 2025-04-28 DIAGNOSIS — Z85.72 HISTORY OF NON-HODGKIN'S LYMPHOMA: ICD-10-CM

## 2025-04-28 DIAGNOSIS — C85.90 NON-HODGKIN'S LYMPHOMA, UNSPECIFIED BODY REGION, UNSPECIFIED NON-HODGKIN LYMPHOMA TYPE: Primary | ICD-10-CM

## 2025-04-28 LAB
IGA SERPL-MCNC: 15 MG/DL (ref 70–400)
IGG SERPL-MCNC: 333 MG/DL (ref 700–1600)
IGM SERPL-MCNC: 26 MG/DL (ref 40–230)

## 2025-04-28 PROCEDURE — 82784 ASSAY IGA/IGD/IGG/IGM EACH: CPT | Mod: GEALAB | Performed by: INTERNAL MEDICINE

## 2025-04-28 PROCEDURE — 3074F SYST BP LT 130 MM HG: CPT | Performed by: INTERNAL MEDICINE

## 2025-04-28 PROCEDURE — 3079F DIAST BP 80-89 MM HG: CPT | Performed by: INTERNAL MEDICINE

## 2025-04-28 PROCEDURE — 99214 OFFICE O/P EST MOD 30 MIN: CPT | Mod: 25 | Performed by: INTERNAL MEDICINE

## 2025-04-28 PROCEDURE — 3008F BODY MASS INDEX DOCD: CPT | Performed by: INTERNAL MEDICINE

## 2025-04-28 PROCEDURE — 36415 COLL VENOUS BLD VENIPUNCTURE: CPT | Performed by: INTERNAL MEDICINE

## 2025-04-28 PROCEDURE — 99204 OFFICE O/P NEW MOD 45 MIN: CPT | Performed by: INTERNAL MEDICINE

## 2025-04-28 ASSESSMENT — COLUMBIA-SUICIDE SEVERITY RATING SCALE - C-SSRS
6. HAVE YOU EVER DONE ANYTHING, STARTED TO DO ANYTHING, OR PREPARED TO DO ANYTHING TO END YOUR LIFE?: NO
1. IN THE PAST MONTH, HAVE YOU WISHED YOU WERE DEAD OR WISHED YOU COULD GO TO SLEEP AND NOT WAKE UP?: NO
2. HAVE YOU ACTUALLY HAD ANY THOUGHTS OF KILLING YOURSELF?: NO

## 2025-04-28 ASSESSMENT — ENCOUNTER SYMPTOMS
CARDIOVASCULAR NEGATIVE: 1
GASTROINTESTINAL NEGATIVE: 1
CONSTITUTIONAL NEGATIVE: 1
RESPIRATORY NEGATIVE: 1

## 2025-04-28 ASSESSMENT — PAIN SCALES - GENERAL: PAINLEVEL_OUTOF10: 0-NO PAIN

## 2025-04-28 NOTE — PATIENT INSTRUCTIONS
Today you met with your hematologist/oncologist.  Recent notes and findings were discussed and questions answered.  Scheduling orders were placed.  While we appreciate that you verbalized understanding, if any questions arise after leaving, please do not hesitate to call the office to discuss.  [phone] Jacob Mccray.  Dr Rodgers will speak with you on a virtual appointment after your CT scan. HE will then do another CT scan in one year with rosita appointment after to review. Please have labs done every 3 months, those are standing orders in the computer and can be done at any  lab.

## 2025-04-28 NOTE — PROGRESS NOTES
Patient ID: Errol Baeza is a 59 y.o. male.  Referring Physician: SARA Rodgers  [id number] [address] [address]  Primary Care Provider: SARA Rodgers  Visit Type:  Initial Visit     Verbal consent was requested and obtained from patient on this date for a telehealth visit.    Subjective    HPI  Patient is 59-year-old male with history of non-Hodgkin's lymphoma on maintenance chemotherapy, last cycle was 2/2023. His hematologist wanted to wait for immunoglobulin number recovered prior to reinitiating chemotherapy.     History of Oncologic illness  Errol Baeza is a 59 y.o. male who presents with the following:  Patient has previous history of lymphoma as per below and also has had a splenectomy.  Has been on Rituxan maintenance:   1. History of autoimmune hemolytic anemia diagnosed in 1995.  2.  S/p splenectomy for the autoimmune hemolytic anemia.  3.  Recurrent episodes of thrombocytopenia/autoimmune related.  Responded well with prednisone.  4.  Non-Hodgkin's lymphoma/follicular cell histology.  Indolent.  On observation.  5.  S/P  chemotherapy with Bendamustine and Rituximab x 4c.  6.  PET scan done after 4 cycles of chemotherapy in July 2021 showed excellent metabolic response.  7.  On maintenance rituximab therapy, He has completed 10 out of 12 of maintenance  Review of Systems   Constitutional: Negative.    HENT:  Negative.     Respiratory: Negative.     Cardiovascular: Negative.    Gastrointestinal: Negative.         Objective   BSA: There is no height or weight on file to calculate BSA.  There were no vitals taken for this visit.     has a past medical history of Hemolytic anemia and Lymphoma.   has a past surgical history that includes Back surgery; Cholecystectomy; Knee surgery; Shoulder surgery; and Splenectomy, total.  Family History[1]  Oncology History    No history exists.       Errol Baeza  reports that he has never smoked. He has never used smokeless  tobacco.  He  reports current alcohol use of about 2.0 standard drinks of alcohol per week.  He  reports no history of drug use.    Physical Exam  Constitutional:       Appearance: Normal appearance.   HENT:      Head: Normocephalic and atraumatic.   Eyes:      Extraocular Movements: Extraocular movements intact.      Pupils: Pupils are equal, round, and reactive to light.   Neurological:      Mental Status: He is alert.         WBC   Date/Time Value Ref Range Status   06/12/2023 08:05 AM 6.4 4.5 - 11.0 K/UL Final     WHITE BLOOD CELL COUNT   Date/Time Value Ref Range Status   04/03/2025 07:39 AM 9.1 3.8 - 10.8 Thousand/uL Final     nRBC   Date Value Ref Range Status   06/12/2023 0 0 /100 WBC Final     Comment:     Performed at [address] [address]     RBC   Date Value Ref Range Status   06/12/2023 4.73 4.5 - 5.5 M/UL Final     RED BLOOD CELL COUNT   Date Value Ref Range Status   04/03/2025 5.19 4.20 - 5.80 Million/uL Final     Hemoglobin   Date Value Ref Range Status   06/12/2023 15.4 13.5 - 16.5 GM/DL Final     HEMOGLOBIN   Date Value Ref Range Status   04/03/2025 16.8 13.2 - 17.1 g/dL Final     Hematocrit   Date Value Ref Range Status   06/12/2023 46.4 41 - 50 % Final     HEMATOCRIT   Date Value Ref Range Status   04/03/2025 49.8 38.5 - 50.0 % Final     MCV   Date/Time Value Ref Range Status   04/03/2025 07:39 AM 96.0 80.0 - 100.0 fL Final   06/12/2023 08:05 AM 98.1 80 - 100 FL Final     MCH   Date/Time Value Ref Range Status   04/03/2025 07:39 AM 32.4 27.0 - 33.0 pg Final   06/12/2023 08:05 AM 32.6 26 - 34 PG Final     MCHC   Date/Time Value Ref Range Status   04/03/2025 07:39 AM 33.7 32.0 - 36.0 g/dL Final     Comment:     For adults, a slight decrease in the calculated MCHC  value (in the range of 30 to 32 g/dL) is most likely  not clinically significant; however, it should be  interpreted with caution in correlation with other  red cell parameters and the patient's  "clinical  condition.     06/12/2023 08:05 AM 33.2 31 - 37 % Final     RDW   Date/Time Value Ref Range Status   04/03/2025 07:39 AM 13.4 11.0 - 15.0 % Final     Platelets   Date/Time Value Ref Range Status   06/12/2023 08:05  150 - 450 K/UL Final     PLATELET COUNT   Date/Time Value Ref Range Status   04/03/2025 07:39  140 - 400 Thousand/uL Final     MPV   Date/Time Value Ref Range Status   04/03/2025 07:39 AM 10.4 7.5 - 12.5 fL Final   06/12/2023 08:05 AM 10.7 7.0 - 12.6 CU Final     No results found for: \"NEUTOPHILPCT\"  No results found for: \"IGPCT\"  LYMPHOCYTES   Date/Time Value Ref Range Status   04/03/2025 07:39 AM 21.9 % Final     MONOCYTES   Date/Time Value Ref Range Status   04/03/2025 07:39 AM 11.2 % Final     EOSINOPHILS   Date/Time Value Ref Range Status   04/03/2025 07:39 AM 7.8 % Final     BASOPHILS   Date/Time Value Ref Range Status   04/03/2025 07:39 AM 1.2 % Final     No results found for: \"NEUTROABS\"  No results found for: \"IGABSOL\"  No results found for: \"LYMPHSABS\"  No results found for: \"MONOSABS\"  ABSOLUTE EOSINOPHILS   Date/Time Value Ref Range Status   04/03/2025 07:39  (H) 15 - 500 cells/uL Final     ABSOLUTE BASOPHILS   Date/Time Value Ref Range Status   04/03/2025 07:39  0 - 200 cells/uL Final       Assessment/Plan      Presenting today to establish care.  He is needs essentially surveillance for lymphoma.  Will do yearly CBCs and CT chest abdomen and pelvis. IgGAM. RTC 1 year.    No IVIG for the past 2 yeaers: prefers not to get IVIG: Had 2 years  of maintenance Rituxan ended Feb 2023     Diagnoses and all orders for this visit:  History of non-Hodgkin's lymphoma  -     Referral To Hematology and Oncology  -     CT chest abdomen pelvis w IV contrast; Future  -     Immunoglobulins (IgG, IgA, IgM); Standing  -     Clinic Appointment Request; Future  -     Clinic Appointment Request Virtual Est (review CT); Future           Martin-Tutu Vishal, MD                          "     [1]   Family History  Problem Relation Name Age of Onset    Breast cancer Mother      Heart disease Father      Hypertension Other          family hx

## 2025-05-06 ENCOUNTER — TELEMEDICINE (OUTPATIENT)
Dept: HEMATOLOGY/ONCOLOGY | Facility: CLINIC | Age: 60
End: 2025-05-06
Payer: COMMERCIAL

## 2025-05-06 DIAGNOSIS — Z85.72 HISTORY OF NON-HODGKIN'S LYMPHOMA: ICD-10-CM

## 2025-05-06 PROCEDURE — 99214 OFFICE O/P EST MOD 30 MIN: CPT | Performed by: INTERNAL MEDICINE

## 2025-05-06 NOTE — PATIENT INSTRUCTIONS
Today you met with your hematologist/oncologist.  Recent labs were discussed and questions answered.  Scheduling orders were placed.  While we appreciate that you verbalized understanding, if any questions arise after leaving, please do not hesitate to call the office to discuss.  [phone] Jacob Mccray.  Dr Rodgers will see you in 3 months to review your labs. Please have those labs done one week prior to that appointment. As we discussed, if you decide to have the CT done we can fax the order to wherever you choose. Please visit the website [email or web address] to choose a location. You can find locations and prices for the CT listed on the website.

## 2025-05-06 NOTE — PROGRESS NOTES
Patient ID: Errol Baeza is a 59 y.o. male.  Referring Physician: No referring provider defined for this encounter.  Primary Care Provider: SARA Rodgers  Visit Type:  Follow Up     Verbal consent was requested and obtained from patient on this date for a telehealth visit.    Subjective    HPI  Patient is 59-year-old male with history of non-Hodgkin's lymphoma on maintenance chemotherapy, last cycle was 2/2023. His hematologist wanted to wait for immunoglobulin number recovered prior to reinitiating chemotherapy.      History of Oncologic illness  Errol Baeza is a 59 y.o. male who presents with the following:  Patient has previous history of lymphoma as per below and also has had a splenectomy.  Has been on Rituxan maintenance:   1. History of autoimmune hemolytic anemia diagnosed in 1995.  2.  S/p splenectomy for the autoimmune hemolytic anemia.  3.  Recurrent episodes of thrombocytopenia/autoimmune related.  Responded well with prednisone.  4.  Non-Hodgkin's lymphoma/follicular cell histology.  Indolent.  On observation.  5.  S/P  chemotherapy with Bendamustine and Rituximab x 4c.  6.  PET scan done after 4 cycles of chemotherapy in July 2021 showed excellent metabolic response.  7.  On maintenance rituximab therapy, He has completed 10 out of 12 of maintenance    Review of Systems - Oncology     Objective   BSA: There is no height or weight on file to calculate BSA.  There were no vitals taken for this visit.     has a past medical history of Hemolytic anemia and Lymphoma.   has a past surgical history that includes Back surgery; Cholecystectomy; Knee surgery; Shoulder surgery; and Splenectomy, total.  Family History[1]  Oncology History    No history exists.       Errol Baeza  reports that he has never smoked. He has never used smokeless tobacco.  He  reports current alcohol use of about 2.0 standard drinks of alcohol per week.  He  reports no history of drug use.    Physical Exam    WBC   Date/Time  Value Ref Range Status   06/12/2023 08:05 AM 6.4 4.5 - 11.0 K/UL Final     WHITE BLOOD CELL COUNT   Date/Time Value Ref Range Status   04/03/2025 07:39 AM 9.1 3.8 - 10.8 Thousand/uL Final     nRBC   Date Value Ref Range Status   06/12/2023 0 0 /100 WBC Final     Comment:     Performed at [address] [address]     RBC   Date Value Ref Range Status   06/12/2023 4.73 4.5 - 5.5 M/UL Final     RED BLOOD CELL COUNT   Date Value Ref Range Status   04/03/2025 5.19 4.20 - 5.80 Million/uL Final     Hemoglobin   Date Value Ref Range Status   06/12/2023 15.4 13.5 - 16.5 GM/DL Final     HEMOGLOBIN   Date Value Ref Range Status   04/03/2025 16.8 13.2 - 17.1 g/dL Final     Hematocrit   Date Value Ref Range Status   06/12/2023 46.4 41 - 50 % Final     HEMATOCRIT   Date Value Ref Range Status   04/03/2025 49.8 38.5 - 50.0 % Final     MCV   Date/Time Value Ref Range Status   04/03/2025 07:39 AM 96.0 80.0 - 100.0 fL Final   06/12/2023 08:05 AM 98.1 80 - 100 FL Final     MCH   Date/Time Value Ref Range Status   04/03/2025 07:39 AM 32.4 27.0 - 33.0 pg Final   06/12/2023 08:05 AM 32.6 26 - 34 PG Final     MCHC   Date/Time Value Ref Range Status   04/03/2025 07:39 AM 33.7 32.0 - 36.0 g/dL Final     Comment:     For adults, a slight decrease in the calculated MCHC  value (in the range of 30 to 32 g/dL) is most likely  not clinically significant; however, it should be  interpreted with caution in correlation with other  red cell parameters and the patient's clinical  condition.     06/12/2023 08:05 AM 33.2 31 - 37 % Final     RDW   Date/Time Value Ref Range Status   04/03/2025 07:39 AM 13.4 11.0 - 15.0 % Final     Platelets   Date/Time Value Ref Range Status   06/12/2023 08:05  150 - 450 K/UL Final     PLATELET COUNT   Date/Time Value Ref Range Status   04/03/2025 07:39  140 - 400 Thousand/uL Final     MPV   Date/Time Value Ref Range Status   04/03/2025 07:39 AM 10.4 7.5 - 12.5 fL Final   06/12/2023  "08:05 AM 10.7 7.0 - 12.6 CU Final     No results found for: \"NEUTOPHILPCT\"  No results found for: \"IGPCT\"  LYMPHOCYTES   Date/Time Value Ref Range Status   04/03/2025 07:39 AM 21.9 % Final     MONOCYTES   Date/Time Value Ref Range Status   04/03/2025 07:39 AM 11.2 % Final     EOSINOPHILS   Date/Time Value Ref Range Status   04/03/2025 07:39 AM 7.8 % Final     BASOPHILS   Date/Time Value Ref Range Status   04/03/2025 07:39 AM 1.2 % Final     No results found for: \"NEUTROABS\"  No results found for: \"IGABSOL\"  No results found for: \"LYMPHSABS\"  No results found for: \"MONOSABS\"  ABSOLUTE EOSINOPHILS   Date/Time Value Ref Range Status   04/03/2025 07:39  (H) 15 - 500 cells/uL Final     ABSOLUTE BASOPHILS   Date/Time Value Ref Range Status   04/03/2025 07:39  0 - 200 cells/uL Final       Assessment/Plan      Presenting today to establish care.  He is needs essentially surveillance for lymphoma.  Will do yearly CBCs and CT chest abdomen and pelvis. IgGAM. RTC 1 year.     No IVIG for the past 2 years: prefers not to get IVIG: Had 2 years  of maintenance Rituxan ended Feb 2023.    5/6/2023.  Patient seen today IgG comparing 20 23-20 25 shows a decline from about 630-333.  Patient continues to opt not to have any intervention done.  Will repeat immunoglobulin assay in 3 months to confirm hypogammaglobulinemia which may necessitate IVIG treatments.  Patient's also ordered to have a CT chest abdomen and pelvis to evaluate for lymphoma.  RTC 3 months.    Diagnoses and all orders for this visit:  History of non-Hodgkin's lymphoma  -     Clinic Appointment Request Follow Up (review labs); Future  -     Clinic Appointment Request Virtual Est (review CT)           Bri Rodgers MD                              [1]   Family History  Problem Relation Name Age of Onset    Breast cancer Mother      Heart disease Father      Hypertension Other          family hx     "

## 2025-05-09 ENCOUNTER — PATIENT MESSAGE (OUTPATIENT)
Dept: PRIMARY CARE | Facility: CLINIC | Age: 60
End: 2025-05-09
Payer: COMMERCIAL

## 2025-05-09 DIAGNOSIS — E55.9 VITAMIN D DEFICIENCY: ICD-10-CM

## 2025-05-13 RX ORDER — ERGOCALCIFEROL 1.25 MG/1
50000 CAPSULE ORAL
Qty: 12 CAPSULE | Refills: 0 | Status: SHIPPED | OUTPATIENT
Start: 2025-05-18 | End: 2025-08-10

## 2025-05-15 ENCOUNTER — APPOINTMENT (OUTPATIENT)
Dept: RADIOLOGY | Facility: HOSPITAL | Age: 60
End: 2025-05-15
Payer: COMMERCIAL

## 2025-05-27 ENCOUNTER — APPOINTMENT (OUTPATIENT)
Dept: HEMATOLOGY/ONCOLOGY | Facility: CLINIC | Age: 60
End: 2025-05-27
Payer: COMMERCIAL

## 2025-06-02 ENCOUNTER — LAB (OUTPATIENT)
Dept: LAB | Facility: HOSPITAL | Age: 60
End: 2025-06-02
Payer: COMMERCIAL

## 2025-06-05 ENCOUNTER — TELEPHONE (OUTPATIENT)
Dept: HEMATOLOGY/ONCOLOGY | Facility: CLINIC | Age: 60
End: 2025-06-05
Payer: COMMERCIAL

## 2025-06-05 NOTE — TELEPHONE ENCOUNTER
Called and spoke to patient, let him know that when labs are drawn at a [facility] location the results will take longer to come back but they will cross over and show up in MyChart once back. Let pt know that once back I will have Dr. Rodgers review and then adjust his appt based on how provider would like to proceed.

## 2025-06-10 ENCOUNTER — TELEPHONE (OUTPATIENT)
Dept: HEMATOLOGY/ONCOLOGY | Facility: CLINIC | Age: 60
End: 2025-06-10
Payer: COMMERCIAL

## 2025-06-10 NOTE — TELEPHONE ENCOUNTER
Reason for Conversation  No chief complaint on file.    Background   Calling to obtain recent lab results.  Please call    Disposition   No disposition on file.

## 2025-06-11 ENCOUNTER — TELEPHONE (OUTPATIENT)
Dept: HEMATOLOGY/ONCOLOGY | Facility: CLINIC | Age: 60
End: 2025-06-11
Payer: COMMERCIAL

## 2025-06-11 NOTE — TELEPHONE ENCOUNTER
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 Pt had spleen removed and his Igg levels continue to drop. He has also been running low grade fevers for days when he gets sick with extreme fatigue and drainage down back of throat. Does not want to do replacement therapy because in past as soon as he stopped #'s dropped again. He wants s opinion as to whether he should go out on disability with work.    Disposition   No disposition on file.

## 2025-06-11 NOTE — TELEPHONE ENCOUNTER
Spoke with patient regarding his concerns.  Recommended he come in a be seen and talk to Dr Rodgers about his lab work and concerns. Pt said he was free Friday and Monday.  Scheduled pt for Monday and sent msg to him per his request with appointment date and time. Patient agreed to plan and verbalized understanding using teach back method.     Initial (On Arrival)

## 2025-06-16 ENCOUNTER — SOCIAL WORK (OUTPATIENT)
Dept: CASE MANAGEMENT | Facility: HOSPITAL | Age: 60
End: 2025-06-16

## 2025-06-16 ENCOUNTER — OFFICE VISIT (OUTPATIENT)
Dept: HEMATOLOGY/ONCOLOGY | Facility: CLINIC | Age: 60
End: 2025-06-16
Payer: COMMERCIAL

## 2025-06-16 VITALS
WEIGHT: 267.86 LBS | TEMPERATURE: 97.7 F | RESPIRATION RATE: 18 BRPM | SYSTOLIC BLOOD PRESSURE: 131 MMHG | OXYGEN SATURATION: 96 % | HEART RATE: 71 BPM | DIASTOLIC BLOOD PRESSURE: 90 MMHG | BODY MASS INDEX: 34.89 KG/M2

## 2025-06-16 DIAGNOSIS — Z85.72 HISTORY OF NON-HODGKIN'S LYMPHOMA: ICD-10-CM

## 2025-06-16 PROCEDURE — 99214 OFFICE O/P EST MOD 30 MIN: CPT | Performed by: INTERNAL MEDICINE

## 2025-06-16 PROCEDURE — 3080F DIAST BP >= 90 MM HG: CPT | Performed by: INTERNAL MEDICINE

## 2025-06-16 PROCEDURE — 1036F TOBACCO NON-USER: CPT | Performed by: INTERNAL MEDICINE

## 2025-06-16 PROCEDURE — 3075F SYST BP GE 130 - 139MM HG: CPT | Performed by: INTERNAL MEDICINE

## 2025-06-16 ASSESSMENT — ENCOUNTER SYMPTOMS
RESPIRATORY NEGATIVE: 1
CARDIOVASCULAR NEGATIVE: 1
CONSTITUTIONAL NEGATIVE: 1
GASTROINTESTINAL NEGATIVE: 1

## 2025-06-16 ASSESSMENT — PAIN SCALES - GENERAL: PAINLEVEL_OUTOF10: 0-NO PAIN

## 2025-06-16 NOTE — PATIENT INSTRUCTIONS
Today you met with your hematologist/oncologist.  Recent labs were discussed and questions answered.  Scheduling orders were placed.  While we appreciate that you verbalized understanding, if any questions arise after leaving, please do not hesitate to call the office to discuss.  [phone] Jacob Mccray  Please have labs drawn every 3 months here at [facility]. Those have been ordered for you. Dr Rodgers will see you in 6 months. Please fax any paperwork needed for your Disability application to [phone].

## 2025-06-16 NOTE — PROGRESS NOTES
Patient ID: Errol Baeza is a 59 y.o. male.  Referring Physician: No referring provider defined for this encounter.  Primary Care Provider: ASRA Rodgers  Visit Type:  Follow Up     Subjective    HPI  Patient is 59-year-old male with history of non-Hodgkin's lymphoma on maintenance chemotherapy, last cycle was 2/2023. His hematologist wanted to wait for immunoglobulin number recovered prior to reinitiating chemotherapy.      History of Oncologic illness  Errol Baeza is a 59 y.o. male who presents with the following:  Patient has previous history of lymphoma as per below and also has had a splenectomy.  Has been on Rituxan maintenance:   1. History of autoimmune hemolytic anemia diagnosed in 1995.  2.  S/p splenectomy for the autoimmune hemolytic anemia.  3.  Recurrent episodes of thrombocytopenia/autoimmune related.  Responded well with prednisone.  4.  Non-Hodgkin's lymphoma/follicular cell histology.  Indolent.  On observation.  5.  S/P  chemotherapy with Bendamustine and Rituximab x 4c.  6.  PET scan done after 4 cycles of chemotherapy in July 2021 showed excellent metabolic response.  7.  On maintenance rituximab therapy, He has completed 10 out of 12 of maintenance  Review of Systems   Constitutional: Negative.    HENT:  Negative.     Respiratory: Negative.     Cardiovascular: Negative.    Gastrointestinal: Negative.         Objective   BSA: 2.5 meters squared  /90 (BP Location: Left arm, Patient Position: Sitting, BP Cuff Size: Large adult)   Pulse 71   Temp 36.5 °C (97.7 °F) (Temporal)   Resp 18   Wt 121 kg (267 lb 13.7 oz)   SpO2 96%   BMI 34.89 kg/m²      has a past medical history of Hemolytic anemia and Lymphoma.   has a past surgical history that includes Back surgery; Cholecystectomy; Knee surgery; Shoulder surgery; and Splenectomy, total.  Family History[1]  Oncology History    No history exists.       Errol Baeza  reports that he has never smoked. He has never used smokeless  tobacco.  He  reports current alcohol use of about 2.0 standard drinks of alcohol per week.  He  reports no history of drug use.    Physical Exam  Constitutional:       Appearance: Normal appearance.   HENT:      Head: Normocephalic and atraumatic.   Eyes:      Extraocular Movements: Extraocular movements intact.      Pupils: Pupils are equal, round, and reactive to light.   Neurological:      Mental Status: He is alert.         WBC   Date/Time Value Ref Range Status   06/12/2023 08:05 AM 6.4 4.5 - 11.0 K/UL Final     WHITE BLOOD CELL COUNT   Date/Time Value Ref Range Status   04/03/2025 07:39 AM 9.1 3.8 - 10.8 Thousand/uL Final     nRBC   Date Value Ref Range Status   06/12/2023 0 0 /100 WBC Final     Comment:     Performed at [address] [address]     RBC   Date Value Ref Range Status   06/12/2023 4.73 4.5 - 5.5 M/UL Final     RED BLOOD CELL COUNT   Date Value Ref Range Status   04/03/2025 5.19 4.20 - 5.80 Million/uL Final     Hemoglobin   Date Value Ref Range Status   06/12/2023 15.4 13.5 - 16.5 GM/DL Final     HEMOGLOBIN   Date Value Ref Range Status   04/03/2025 16.8 13.2 - 17.1 g/dL Final     Hematocrit   Date Value Ref Range Status   06/12/2023 46.4 41 - 50 % Final     HEMATOCRIT   Date Value Ref Range Status   04/03/2025 49.8 38.5 - 50.0 % Final     MCV   Date/Time Value Ref Range Status   04/03/2025 07:39 AM 96.0 80.0 - 100.0 fL Final   06/12/2023 08:05 AM 98.1 80 - 100 FL Final     MCH   Date/Time Value Ref Range Status   04/03/2025 07:39 AM 32.4 27.0 - 33.0 pg Final   06/12/2023 08:05 AM 32.6 26 - 34 PG Final     MCHC   Date/Time Value Ref Range Status   04/03/2025 07:39 AM 33.7 32.0 - 36.0 g/dL Final     Comment:     For adults, a slight decrease in the calculated MCHC  value (in the range of 30 to 32 g/dL) is most likely  not clinically significant; however, it should be  interpreted with caution in correlation with other  red cell parameters and the patient's  "clinical  condition.     06/12/2023 08:05 AM 33.2 31 - 37 % Final     RDW   Date/Time Value Ref Range Status   04/03/2025 07:39 AM 13.4 11.0 - 15.0 % Final     Platelets   Date/Time Value Ref Range Status   06/12/2023 08:05  150 - 450 K/UL Final     PLATELET COUNT   Date/Time Value Ref Range Status   04/03/2025 07:39  140 - 400 Thousand/uL Final     MPV   Date/Time Value Ref Range Status   04/03/2025 07:39 AM 10.4 7.5 - 12.5 fL Final   06/12/2023 08:05 AM 10.7 7.0 - 12.6 CU Final     No results found for: \"NEUTOPHILPCT\"  No results found for: \"IGPCT\"  LYMPHOCYTES   Date/Time Value Ref Range Status   04/03/2025 07:39 AM 21.9 % Final     MONOCYTES   Date/Time Value Ref Range Status   04/03/2025 07:39 AM 11.2 % Final     EOSINOPHILS   Date/Time Value Ref Range Status   04/03/2025 07:39 AM 7.8 % Final     BASOPHILS   Date/Time Value Ref Range Status   04/03/2025 07:39 AM 1.2 % Final     No results found for: \"NEUTROABS\"  No results found for: \"IGABSOL\"  No results found for: \"LYMPHSABS\"  No results found for: \"MONOSABS\"  ABSOLUTE EOSINOPHILS   Date/Time Value Ref Range Status   04/03/2025 07:39  (H) 15 - 500 cells/uL Final     ABSOLUTE BASOPHILS   Date/Time Value Ref Range Status   04/03/2025 07:39  0 - 200 cells/uL Final       Assessment/Plan      Presenting today to establish care.  He is needs essentially surveillance for lymphoma.  Will do yearly CBCs and CT chest abdomen and pelvis. IgGAM. RTC 1 year.     No IVIG for the past 2 yeaers: prefers not to get IVIG: Had 2 years  of maintenance Rituxan ended Feb 2023 5/6/2023. Patient seen today IgG comparing 20 23-20 25 shows a decline from about 630-333. Patient continues to opt not to have any intervention done. Will repeat immunoglobulin assay in 3 months to confirm hypogammaglobulinemia which may necessitate IVIG treatments. Patient's also ordered to have a CT chest abdomen and pelvis to evaluate for lymphoma. RTC 3 months.     6/16/2025: " Patient opting for support in applying for Short Term Disabilty: Immunoglobulin assay q 3 monthly: RTC 6 months. Patient on surveillance q 6 monthly.     Diagnoses and all orders for this visit:  History of non-Hodgkin's lymphoma  -     Clinic Appointment Request Follow Up (review lab trends); Future       Bri Rodgers MD                              [1]   Family History  Problem Relation Name Age of Onset    Breast cancer Mother      Heart disease Father      Hypertension Other          family hx

## 2025-06-16 NOTE — PROGRESS NOTES
Pt had questions regarding short-term disability through his employer, so SW met with pt in exam room to discuss. 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 He is immune compromised and has been getting frequently ill. Pt says he is considering short-term disability for 6 months to get his health on track. Dr. Rodgers is agreeable to STD for pt. Provided pt with clinic's fax number and asked him to have his  send paperwork over for completion. Pt denied any other SW needs at this time. Encouraged pt to reach out should additional questions or concerns arise.   Danette Norton, MSW, ANILA

## 2025-06-17 ENCOUNTER — SOCIAL WORK (OUTPATIENT)
Dept: CASE MANAGEMENT | Facility: HOSPITAL | Age: 60
End: 2025-06-17
Payer: COMMERCIAL

## 2025-06-17 NOTE — PROGRESS NOTES
Pt called clinic with questions about his short-term disability application. Addressed pt's concerns and assured him forms would be filled out appropriately in regards to his medical concerns. Pt says he will be faxing paperwork to clinic in the next day or so. Pt denied any other SW needs at this time. Encouraged pt to reach out should additional questions or concerns arise.   Danette Norton, MSW, ANILA

## 2025-06-19 ENCOUNTER — SOCIAL WORK (OUTPATIENT)
Dept: CASE MANAGEMENT | Facility: HOSPITAL | Age: 60
End: 2025-06-19
Payer: COMMERCIAL

## 2025-06-19 NOTE — PROGRESS NOTES
Work Documentation  Documentation Type: Short-term disability  Date Received:6/18/25  Individual Requesting Documentation: Errol Baeza  Completed By: ANILA Landeros and Nataliia Bates PA-C  Documentation submitted to : [facility] via fax ([phone]) on 06/19/25  Additional Information: Pt requesting short-term disability due to his immunocompromised state. Paperwork completed with Nataliia as Dr. Rodgers is out of town. Pt notified of completion via [employer] message. Documentation to be scanned into EMR. SW will remain available.   DEYANIRA Landeros LISW

## 2025-07-01 ENCOUNTER — APPOINTMENT (OUTPATIENT)
Dept: NEUROLOGY | Facility: CLINIC | Age: 60
End: 2025-07-01
Payer: COMMERCIAL

## 2025-07-08 ENCOUNTER — SOCIAL WORK (OUTPATIENT)
Dept: CASE MANAGEMENT | Facility: HOSPITAL | Age: 60
End: 2025-07-08
Payer: COMMERCIAL

## 2025-07-08 NOTE — PROGRESS NOTES
Work Documentation  Documentation Type: Letter for Disability  Date Received:7/8/25  Individual Requesting Documentation: Errol Baeza  Completed By: ANILA Landeros and Dr. KATHY Rodgers  Documentation submitted to : [facility] via fax ([phone]) on 07/08/25  Additional Information: Pt called SW requesting a follow up letter be sent his HR saying his condition has not changed and he continues to need short-term disability. Pt says he was approved for STD through 7/15 but needs the letter to have it extended another month. Letter drafted and signed by Dr. Rodgers and faxed to [facility] per pt's request; copy to be scanned into EMR. Pt updated. SW will remain available should additional needs arise.   DEYANIRA Landeros, ANILA

## 2025-07-09 ENCOUNTER — APPOINTMENT (OUTPATIENT)
Dept: CARDIOLOGY | Facility: HOSPITAL | Age: 60
End: 2025-07-09
Payer: COMMERCIAL

## 2025-07-15 ENCOUNTER — APPOINTMENT (OUTPATIENT)
Dept: NEUROLOGY | Facility: CLINIC | Age: 60
End: 2025-07-15
Payer: COMMERCIAL

## 2025-08-12 ENCOUNTER — APPOINTMENT (OUTPATIENT)
Dept: HEMATOLOGY/ONCOLOGY | Facility: CLINIC | Age: 60
End: 2025-08-12
Payer: COMMERCIAL

## 2026-04-28 ENCOUNTER — APPOINTMENT (OUTPATIENT)
Dept: RADIOLOGY | Facility: HOSPITAL | Age: 61
End: 2026-04-28
Payer: COMMERCIAL